# Patient Record
Sex: FEMALE | Race: WHITE | URBAN - NONMETROPOLITAN AREA
[De-identification: names, ages, dates, MRNs, and addresses within clinical notes are randomized per-mention and may not be internally consistent; named-entity substitution may affect disease eponyms.]

---

## 2021-07-01 ENCOUNTER — LAB OUTSIDE AN ENCOUNTER (OUTPATIENT)
Dept: URBAN - NONMETROPOLITAN AREA CLINIC 13 | Facility: CLINIC | Age: 26
End: 2021-07-01

## 2021-07-01 ENCOUNTER — OFFICE VISIT (OUTPATIENT)
Dept: URBAN - NONMETROPOLITAN AREA CLINIC 13 | Facility: CLINIC | Age: 26
End: 2021-07-01
Payer: COMMERCIAL

## 2021-07-01 ENCOUNTER — WEB ENCOUNTER (OUTPATIENT)
Dept: URBAN - NONMETROPOLITAN AREA CLINIC 13 | Facility: CLINIC | Age: 26
End: 2021-07-01

## 2021-07-01 DIAGNOSIS — K92.1 BLOODY STOOL: ICD-10-CM

## 2021-07-01 DIAGNOSIS — R10.9 LEFT SIDED ABDOMINAL PAIN: ICD-10-CM

## 2021-07-01 DIAGNOSIS — R19.7 DIARRHEA, UNSPECIFIED TYPE: ICD-10-CM

## 2021-07-01 PROCEDURE — 99204 OFFICE O/P NEW MOD 45 MIN: CPT | Performed by: INTERNAL MEDICINE

## 2021-07-01 RX ORDER — FLUOXETINE HYDROCHLORIDE 20 MG/1
1 CAPSULE CAPSULE ORAL ONCE A DAY
Status: ACTIVE | COMMUNITY

## 2021-07-01 RX ORDER — FAMOTIDINE 20 MG/1
1 TABLET AT LUNCH AND 1 TABLET AT  BEDTIME TABLET, FILM COATED ORAL TWICE A DAY
Qty: 180 TABLET | Refills: 1 | OUTPATIENT
Start: 2021-07-01

## 2021-07-01 RX ORDER — METRONIDAZOLE 250 MG/1
1 TABLET TABLET ORAL THREE TIMES A DAY
Qty: 21 TABLET | Refills: 0 | OUTPATIENT
Start: 2021-07-01 | End: 2021-07-08

## 2021-07-01 NOTE — HPI-TODAY'S VISIT:
7/1/21 Fariba Hinkle is a very pleasant 24 YO  who presents for first appointment with new onset blood stools with mucous and lower abdominal pain as well as borborgymi and excessive gas that began about 6 weeks ago. Prior to onset she had normal daily stools. Mom, Maribel, is with her today. No history of rectal bleeding previously. She did start a new diet, Optavia, that is a diet high in soy with low carbs about a month prior to onset. Since onset she has stopped most of the dietary supplements other than an occasional bar but no improvement in her symptoms.   She did have a period about 2 weeks ago with n/v associated with the diarrhea x 5 days. No fever or shaking chills. She also reports some mild intermittent epigastric burning discomfort with greasy foods. No dysphagia. She has lost about 15# with the new diet and exercise regimen but no weight loss with above symptoms. She has a paternal cousin with Crohns. No f/h UC, CRC, colon polyps.   No changes in medications/new medications. No antibiotics. No recent travel. No sick contacts. She did go tubing on a river in [place] about a month ago however symptoms started prior to this. She has tried Imodium but was advised to stop this. Also tried Gas X and Tums with no improvement in her symptoms. She has not tried probiotics. TG

## 2021-07-01 NOTE — PHYSICAL EXAM GASTROINTESTINAL
Abdomen  , soft, left lower quadrant and left upper quadrant tenderness on palpation, nondistended , no masses palpable , normal bowel sounds , no hepatomegaly present

## 2021-07-03 LAB
A/G RATIO: 1.8
ALBUMIN: 4.6
ALKALINE PHOSPHATASE: 78
ALT (SGPT): 16
AST (SGOT): 18
BASO (ABSOLUTE): 0.1
BASOS: 1
BILIRUBIN, TOTAL: 0.4
BUN/CREATININE RATIO: 20
BUN: 13
C-REACTIVE PROTEIN, QUANT: 5
CALCIUM: 9.5
CARBON DIOXIDE, TOTAL: 24
CHLORIDE: 101
CREATININE: 0.64
EGFR IF AFRICN AM: 143
EGFR IF NONAFRICN AM: 124
ENDOMYSIAL ANTIBODY IGA: NEGATIVE
EOS (ABSOLUTE): 0.3
EOS: 4
GLOBULIN, TOTAL: 2.6
GLUCOSE: 63
HEMATOCRIT: 40.5
HEMATOLOGY COMMENTS:: (no result)
HEMOGLOBIN: 13
IMMATURE CELLS: (no result)
IMMATURE GRANS (ABS): 0
IMMATURE GRANULOCYTES: 0
IMMUNOGLOBULIN A, QN, SERUM: 176
LYMPHS (ABSOLUTE): 1.8
LYMPHS: 23
MCH: 30.2
MCHC: 32.1
MCV: 94
MONOCYTES(ABSOLUTE): 0.6
MONOCYTES: 8
NEUTROPHILS (ABSOLUTE): 5.1
NEUTROPHILS: 64
NRBC: (no result)
PLATELETS: 351
POTASSIUM: 4.3
PROTEIN, TOTAL: 7.2
RBC: 4.31
RDW: 12.3
SEDIMENTATION RATE-WESTERGREN: 7
SODIUM: 139
T-TRANSGLUTAMINASE (TTG) IGA: <2
TSH: 1.3
WBC: 7.9

## 2021-07-06 ENCOUNTER — CLAIMS CREATED FROM THE CLAIM WINDOW (OUTPATIENT)
Dept: URBAN - METROPOLITAN AREA CLINIC 4 | Facility: CLINIC | Age: 26
End: 2021-07-06
Payer: COMMERCIAL

## 2021-07-06 ENCOUNTER — OFFICE VISIT (OUTPATIENT)
Dept: URBAN - NONMETROPOLITAN AREA SURGERY CENTER 1 | Facility: SURGERY CENTER | Age: 26
End: 2021-07-06
Payer: COMMERCIAL

## 2021-07-06 DIAGNOSIS — K62.5 ANAL BLEEDING: ICD-10-CM

## 2021-07-06 DIAGNOSIS — K51.911 ULCERATIVE COLITIS, UNSPECIFIED WITH RECTAL BLEEDING: ICD-10-CM

## 2021-07-06 DIAGNOSIS — K51.20 CHRONIC ULCERATIVE PROCTITIS: ICD-10-CM

## 2021-07-06 PROCEDURE — 88305 TISSUE EXAM BY PATHOLOGIST: CPT | Performed by: PATHOLOGY

## 2021-07-06 PROCEDURE — G8907 PT DOC NO EVENTS ON DISCHARG: HCPCS | Performed by: INTERNAL MEDICINE

## 2021-07-06 PROCEDURE — 45380 COLONOSCOPY AND BIOPSY: CPT | Performed by: INTERNAL MEDICINE

## 2021-07-06 RX ORDER — FAMOTIDINE 20 MG/1
1 TABLET AT LUNCH AND 1 TABLET AT  BEDTIME TABLET, FILM COATED ORAL TWICE A DAY
Qty: 180 TABLET | Refills: 1 | Status: ACTIVE | COMMUNITY
Start: 2021-07-01

## 2021-07-06 RX ORDER — FLUOXETINE HYDROCHLORIDE 20 MG/1
1 CAPSULE CAPSULE ORAL ONCE A DAY
Status: ACTIVE | COMMUNITY

## 2021-07-06 RX ORDER — METRONIDAZOLE 250 MG/1
1 TABLET TABLET ORAL THREE TIMES A DAY
Qty: 21 TABLET | Refills: 0 | Status: ACTIVE | COMMUNITY
Start: 2021-07-01 | End: 2021-07-08

## 2021-07-15 LAB
C DIFFICILE TOXINS A+B, EIA: NEGATIVE
CALPROTECTIN, FECAL: 589
CAMPYLOBACTER CULTURE: (no result)
E COLI SHIGA TOXIN EIA: NEGATIVE
Lab: (no result)
OVA + PARASITE EXAM: (no result)
SALMONELLA/SHIGELLA SCREEN: (no result)
WHITE BLOOD CELLS (WBC), STOOL: (no result)

## 2021-07-29 ENCOUNTER — OFFICE VISIT (OUTPATIENT)
Dept: URBAN - NONMETROPOLITAN AREA CLINIC 13 | Facility: CLINIC | Age: 26
End: 2021-07-29
Payer: COMMERCIAL

## 2021-07-29 DIAGNOSIS — K92.1 BLOODY STOOL: ICD-10-CM

## 2021-07-29 DIAGNOSIS — K51.20 ULCERATIVE PROCTITIS WITHOUT COMPLICATION: ICD-10-CM

## 2021-07-29 DIAGNOSIS — R10.9 LEFT SIDED ABDOMINAL PAIN: ICD-10-CM

## 2021-07-29 PROCEDURE — 99214 OFFICE O/P EST MOD 30 MIN: CPT | Performed by: INTERNAL MEDICINE

## 2021-07-29 RX ORDER — PREDNISONE 10 MG/1
4 TABLETS X 7 DAYS, THEN 3 TABLETS X 7 DAYS, THEN 2 TABLETS X 7 DAYS, THEN 1 TABLET X 7 DAYS TABLET ORAL ONCE A DAY
Qty: 70 TABLETS | Refills: 2 | OUTPATIENT
Start: 2021-07-29 | End: 2021-10-21

## 2021-07-29 RX ORDER — FLUOXETINE HYDROCHLORIDE 20 MG/1
1 CAPSULE CAPSULE ORAL ONCE A DAY
Status: ACTIVE | COMMUNITY

## 2021-07-29 RX ORDER — FAMOTIDINE 20 MG/1
1 TABLET AT LUNCH AND 1 TABLET AT  BEDTIME TABLET, FILM COATED ORAL TWICE A DAY
Qty: 180 TABLET | Refills: 1 | Status: ACTIVE | COMMUNITY
Start: 2021-07-01

## 2021-07-29 RX ORDER — MESALAMINE 375 MG/1
4 CAPSULES IN THE MORNING CAPSULE, EXTENDED RELEASE ORAL ONCE A DAY
Qty: 120 CAPSULES | Refills: 6 | OUTPATIENT
Start: 2021-07-29 | End: 2022-02-23

## 2021-07-29 NOTE — HPI-TODAY'S VISIT:
7/1/21 Fariba Hinkle is a very pleasant 26 YO  who presents for first appointment with new onset blood stools with mucous and lower abdominal pain as well as borborgymi and excessive gas that began about 6 weeks ago. Prior to onset she had normal daily stools. Mom, Maribel, is with her today. No history of rectal bleeding previously. She did start a new diet, Optavia, that is a diet high in soy with low carbs about a month prior to onset. Since onset she has stopped most of the dietary supplements other than an occasional bar but no improvement in her symptoms.   She did have a period about 2 weeks ago with n/v associated with the diarrhea x 5 days. No fever or shaking chills. She also reports some mild intermittent epigastric burning discomfort with greasy foods. No dysphagia. She has lost about 15# with the new diet and exercise regimen but no weight loss with above symptoms. She has a paternal cousin with Crohns. No f/h UC, CRC, colon polyps.   No changes in medications/new medications. No antibiotics. No recent travel. No sick contacts. She did go tubing on a river in [place] about a month ago however symptoms started prior to this. She has tried Imodium but was advised to stop this. Also tried Gas X and Tums with no improvement in her symptoms. She has not tried probiotics. TG  7/29/21 Fariba presents for follow up after colonoscopy. Procedure completed 7/6/21 with Dr. Becerra with moderate proctitis, sparing of the rectosigmoid, sigmoid, descending, transverse, ascending, and cecum. Path c/w UC. No left sided biopsies. Dr. Becerra sent in Prednisone 40mg daily and Apriso 4 tablets daily however her pharmacy did not receive the prescriptions. She did not call for the prescriptions. On no treatment other than probiotics.  She continues with above symptoms including bloody loose stools, mucous, abdominal pain, loss of appetite. etc. No fever or chills. TG

## 2021-07-29 NOTE — PHYSICAL EXAM EXTREMITIES:
no edema I have personally seen and examined this patient.  I have fully participated in the care of this patient. I have reviewed all pertinent clinical information, including history, physical exam, plan and the Resident’s note and agree except as noted.

## 2021-08-12 ENCOUNTER — OFFICE VISIT (OUTPATIENT)
Dept: URBAN - NONMETROPOLITAN AREA CLINIC 13 | Facility: CLINIC | Age: 26
End: 2021-08-12
Payer: COMMERCIAL

## 2021-08-12 DIAGNOSIS — K92.1 BLOODY STOOL: ICD-10-CM

## 2021-08-12 DIAGNOSIS — Z12.11 COLON CANCER SCREENING: ICD-10-CM

## 2021-08-12 DIAGNOSIS — R10.9 LEFT SIDED ABDOMINAL PAIN: ICD-10-CM

## 2021-08-12 DIAGNOSIS — K51.20 ULCERATIVE PROCTITIS WITHOUT COMPLICATION: ICD-10-CM

## 2021-08-12 PROCEDURE — 99214 OFFICE O/P EST MOD 30 MIN: CPT | Performed by: INTERNAL MEDICINE

## 2021-08-12 RX ORDER — FLUOXETINE HYDROCHLORIDE 20 MG/1
1 CAPSULE CAPSULE ORAL ONCE A DAY
Status: ACTIVE | COMMUNITY

## 2021-08-12 RX ORDER — PREDNISONE 10 MG/1
4 TABLETS X 7 DAYS, THEN 3 TABLETS X 7 DAYS, THEN 2 TABLETS X 7 DAYS, THEN 1 TABLET X 7 DAYS TABLET ORAL ONCE A DAY
Qty: 70 TABLETS | Refills: 2 | Status: ACTIVE | COMMUNITY
Start: 2021-07-29 | End: 2021-10-21

## 2021-08-12 RX ORDER — MESALAMINE 375 MG/1
4 CAPSULES IN THE MORNING CAPSULE, EXTENDED RELEASE ORAL ONCE A DAY
Qty: 120 CAPSULES | Refills: 6 | Status: ACTIVE | COMMUNITY
Start: 2021-07-29 | End: 2022-02-23

## 2021-08-12 RX ORDER — FAMOTIDINE 20 MG/1
1 TABLET AT LUNCH AND 1 TABLET AT  BEDTIME TABLET, FILM COATED ORAL TWICE A DAY
Qty: 180 TABLET | Refills: 1 | Status: ACTIVE | COMMUNITY
Start: 2021-07-01

## 2021-08-12 RX ORDER — PREDNISONE 10 MG/1
4 TABLETS X 7 DAYS, THEN 3 TABLETS X 7 DAYS, THEN 2 TABLETS X 7 DAYS, THEN 1 TABLET X 7 DAYS TABLET ORAL ONCE A DAY
OUTPATIENT
Start: 2021-07-29

## 2021-08-12 RX ORDER — MESALAMINE 375 MG/1
4 CAPSULES IN THE MORNING CAPSULE, EXTENDED RELEASE ORAL ONCE A DAY
OUTPATIENT
Start: 2021-07-29

## 2021-08-12 NOTE — HPI-TODAY'S VISIT:
7/1/21 Fariba Hinkle is a very pleasant 24 YO  who presents for first appointment with new onset blood stools with mucous and lower abdominal pain as well as borborgymi and excessive gas that began about 6 weeks ago. Prior to onset she had normal daily stools. Mom, Maribel, is with her today. No history of rectal bleeding previously. She did start a new diet, Optavia, that is a diet high in soy with low carbs about a month prior to onset. Since onset she has stopped most of the dietary supplements other than an occasional bar but no improvement in her symptoms.   She did have a period about 2 weeks ago with n/v associated with the diarrhea x 5 days. No fever or shaking chills. She also reports some mild intermittent epigastric burning discomfort with greasy foods. No dysphagia. She has lost about 15# with the new diet and exercise regimen but no weight loss with above symptoms. She has a paternal cousin with Crohns. No f/h UC, CRC, colon polyps.   No changes in medications/new medications. No antibiotics. No recent travel. No sick contacts. She did go tubing on a river in [place] about a month ago however symptoms started prior to this. She has tried Imodium but was advised to stop this. Also tried Gas X and Tums with no improvement in her symptoms. She has not tried probiotics. TG  7/29/21 Fariba presents for follow up after colonoscopy. Procedure completed 7/6/21 with Dr. eBcerra with moderate proctitis, sparing of the rectosigmoid, sigmoid, descending, transverse, ascending, and cecum. Path c/w UC. No left sided biopsies. Dr. Becerra sent in Prednisone 40mg daily and Apriso 4 tablets daily however her pharmacy did not receive the prescriptions. She did not call for the prescriptions. On no treatment other than probiotics.  She continues with above symptoms including bloody loose stools, mucous, abdominal pain, loss of appetite. etc. No fever or chills. 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  She is on Apriso 4 tablets daily.  This was affordable; only $30. Continues on prednisone taper, currently on 30 mg daily and will titrate down to 20 mg tomorrow.  She is having normal bowel movements 1-2 times a day.  No rectal bleeding or mucus.  Tenesmus has resolved.  No nocturnal awakenings.  Her appetite is improving.  She did struggle with some constipation and gas with bloating last week but that seems to be improving as well.  She has multiple questions regarding diet and possible FODMAP.  We discussed this at length.  Hold off until she has clinical remission but will consider nutrition consultation at follow-up.  She has no new complaints and overall is happy with her improvement.  TG

## 2021-09-07 ENCOUNTER — TELEPHONE ENCOUNTER (OUTPATIENT)
Dept: URBAN - NONMETROPOLITAN AREA CLINIC 2 | Facility: CLINIC | Age: 26
End: 2021-09-07

## 2021-11-11 ENCOUNTER — OFFICE VISIT (OUTPATIENT)
Dept: URBAN - NONMETROPOLITAN AREA CLINIC 13 | Facility: CLINIC | Age: 26
End: 2021-11-11

## 2021-12-06 ENCOUNTER — OFFICE VISIT (OUTPATIENT)
Dept: URBAN - NONMETROPOLITAN AREA CLINIC 2 | Facility: CLINIC | Age: 26
End: 2021-12-06
Payer: COMMERCIAL

## 2021-12-06 DIAGNOSIS — M25.511 PAIN IN RIGHT SHOULDER: ICD-10-CM

## 2021-12-06 DIAGNOSIS — K51.20 ULCERATIVE PROCTITIS WITHOUT COMPLICATION: ICD-10-CM

## 2021-12-06 DIAGNOSIS — Z12.11 COLON CANCER SCREENING: ICD-10-CM

## 2021-12-06 DIAGNOSIS — R10.9 LEFT SIDED ABDOMINAL PAIN: ICD-10-CM

## 2021-12-06 DIAGNOSIS — M25.512 PAIN IN LEFT SHOULDER: ICD-10-CM

## 2021-12-06 DIAGNOSIS — K92.1 BLOODY STOOL: ICD-10-CM

## 2021-12-06 PROCEDURE — 99214 OFFICE O/P EST MOD 30 MIN: CPT | Performed by: NURSE PRACTITIONER

## 2021-12-06 RX ORDER — ADALIMUMAB 80MG/0.8ML
80MG DAY 1, DAY 2, DAY 15 KIT SUBCUTANEOUS
Qty: 3 PRE-FILLED PEN SYRINGE | Refills: 0 | OUTPATIENT
Start: 2021-12-06 | End: 2021-12-21

## 2021-12-06 RX ORDER — MESALAMINE 375 MG/1
4 CAPSULES IN THE MORNING CAPSULE, EXTENDED RELEASE ORAL ONCE A DAY
Status: ON HOLD | COMMUNITY
Start: 2021-07-29

## 2021-12-06 RX ORDER — FLUOXETINE HYDROCHLORIDE 20 MG/1
1 CAPSULE CAPSULE ORAL ONCE A DAY
Status: ACTIVE | COMMUNITY

## 2021-12-06 RX ORDER — PREDNISONE 10 MG/1
4 TABLETS X 7 DAYS, THEN 3 TABLETS X 7 DAYS, THEN 2 TABLETS X 7 DAYS, THEN 1 TABLET X 7 DAYS TABLET ORAL ONCE A DAY
Qty: 70 TABLETS | Refills: 0 | OUTPATIENT

## 2021-12-06 RX ORDER — ADALIMUMAB 40MG/0.4ML
1 SUBQ  Q 2 WEEKS KIT SUBCUTANEOUS
Qty: 6 PRE-FILLED PEN SYRINGE | Refills: 1 | OUTPATIENT
Start: 2021-12-06 | End: 2022-05-23

## 2021-12-06 RX ORDER — PREDNISONE 10 MG/1
4 TABLETS X 7 DAYS, THEN 3 TABLETS X 7 DAYS, THEN 2 TABLETS X 7 DAYS, THEN 1 TABLET X 7 DAYS TABLET ORAL ONCE A DAY
Status: ON HOLD | COMMUNITY
Start: 2021-07-29

## 2021-12-06 RX ORDER — FAMOTIDINE 20 MG/1
1 TABLET AT LUNCH AND 1 TABLET AT  BEDTIME TABLET, FILM COATED ORAL TWICE A DAY
Qty: 180 TABLET | Refills: 1 | Status: ACTIVE | COMMUNITY
Start: 2021-07-01

## 2021-12-06 NOTE — HPI-TODAY'S VISIT:
7/1/21 Fariba Hinkle is a very pleasant 26 YO  who presents for first appointment with new onset blood stools with mucous and lower abdominal pain as well as borborgymi and excessive gas that began about 6 weeks ago. Prior to onset she had normal daily stools. Mom, Maribel, is with her today. No history of rectal bleeding previously. She did start a new diet, Optavia, that is a diet high in soy with low carbs about a month prior to onset. Since onset she has stopped most of the dietary supplements other than an occasional bar but no improvement in her symptoms.   She did have a period about 2 weeks ago with n/v associated with the diarrhea x 5 days. No fever or shaking chills. She also reports some mild intermittent epigastric burning discomfort with greasy foods. No dysphagia. She has lost about 15# with the new diet and exercise regimen but no weight loss with above symptoms. She has a paternal cousin with Crohns. No f/h UC, CRC, colon polyps.   No changes in medications/new medications. No antibiotics. No recent travel. No sick contacts. She did go tubing on a river in [place] about a month ago however symptoms started prior to this. She has tried Imodium but was advised to stop this. Also tried Gas X and Tums with no improvement in her symptoms. She has not tried probiotics. TG  7/29/21 Fariba presents for follow up after colonoscopy. Procedure completed 7/6/21 with Dr. Becerra with moderate proctitis, sparing of the rectosigmoid, sigmoid, descending, transverse, ascending, and cecum. Path c/w UC. No left sided biopsies. Dr. Becerra sent in Prednisone 40mg daily and Apriso 4 tablets daily however her pharmacy did not receive the prescriptions. She did not call for the prescriptions. On no treatment other than probiotics.  She continues with above symptoms including bloody loose stools, mucous, abdominal pain, loss of appetite. etc. No fever or chills. 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  She is on Apriso 4 tablets daily.  This was affordable; only $30. Continues on prednisone taper, currently on 30 mg daily and will titrate down to 20 mg tomorrow.  She is having normal bowel movements 1-2 times a day.  No rectal bleeding or mucus.  Tenesmus has resolved.  No nocturnal awakenings.  Her appetite is improving.  She did struggle with some constipation and gas with bloating last week but that seems to be improving as well.  She has multiple questions regarding diet and possible FODMAP.  We discussed this at length.  Hold off until she has clinical remission but will consider nutrition consultation at follow-up.  She has no new complaints and overall is happy with her improvement.  TG  12/6/2021 Fariba presents for follow up for ulcerative proctitis. At her last appiontment she was finishing up prednisone taper and doing well with mesalamine 0.375mg 4 a day. She began with diarrhea, rectal bleeding, mucous in stools, lower abdominal pain, and tenesmus about a week after completing the prednisone taper despite remaining on mesalamine. She did not complete labs at her last appointment. She reports she was unable to get anyone in the [place] location to talk about getting an appointment. She has continued with flare for about 2 months now. She has joint pain--mainly shoulders and wrists.  She was diagnosed with a sinus infection at urgent care this weekend and was started on a prednisone dose pack on Saturday. She did have a COVID test that was negative.  Since her last appointment, she did move to [place]. She continues to work at the [employer] on Tuesday and Wednesdays and is off every Monday.

## 2021-12-07 ENCOUNTER — TELEPHONE ENCOUNTER (OUTPATIENT)
Dept: URBAN - NONMETROPOLITAN AREA CLINIC 2 | Facility: CLINIC | Age: 26
End: 2021-12-07

## 2021-12-09 LAB
A/G RATIO: 2
ALBUMIN: 4.9
ALKALINE PHOSPHATASE: 102
ALT (SGPT): 19
AST (SGOT): 19
BASO (ABSOLUTE): 0.1
BASOS: 1
BILIRUBIN, TOTAL: 0.2
BUN/CREATININE RATIO: 22
BUN: 16
C-REACTIVE PROTEIN, QUANT: <1
CALCIUM: 9.7
CALPROTECTIN, FECAL: 77
CARBON DIOXIDE, TOTAL: 22
CHLORIDE: 102
CREATININE: 0.74
EGFR IF AFRICN AM: 129
EGFR IF NONAFRICN AM: 112
EOS (ABSOLUTE): 0.3
EOS: 3
GLOBULIN, TOTAL: 2.5
GLUCOSE: 82
HBSAG SCREEN: NEGATIVE
HCV AB: <0.1
HEMATOCRIT: 42.7
HEMATOLOGY COMMENTS:: (no result)
HEMOGLOBIN: 14.5
HEP A AB, IGM: NEGATIVE
HEP B CORE AB, IGM: NEGATIVE
IMMATURE CELLS: (no result)
IMMATURE GRANS (ABS): 0
IMMATURE GRANULOCYTES: 0
INTERPRETATION:: (no result)
LYMPHS (ABSOLUTE): 2.4
LYMPHS: 25
MCH: 30.9
MCHC: 34
MCV: 91
MONOCYTES(ABSOLUTE): 0.8
MONOCYTES: 9
NEUTROPHILS (ABSOLUTE): 6
NEUTROPHILS: 62
NRBC: (no result)
PLATELETS: 419
POTASSIUM: 4.4
PROTEIN, TOTAL: 7.4
QUANTIFERON CRITERIA: (no result)
QUANTIFERON INCUBATION: (no result)
QUANTIFERON MITOGEN VALUE: >10
QUANTIFERON NIL VALUE: 0.01
QUANTIFERON TB1 AG VALUE: 0
QUANTIFERON TB2 AG VALUE: 0
QUANTIFERON-TB GOLD PLUS: NEGATIVE
RBC: 4.69
RDW: 11.8
SEDIMENTATION RATE-WESTERGREN: 3
SODIUM: 138
WBC: 9.7

## 2022-01-10 ENCOUNTER — OFFICE VISIT (OUTPATIENT)
Dept: URBAN - NONMETROPOLITAN AREA CLINIC 2 | Facility: CLINIC | Age: 27
End: 2022-01-10

## 2022-01-10 ENCOUNTER — TELEPHONE ENCOUNTER (OUTPATIENT)
Dept: URBAN - NONMETROPOLITAN AREA CLINIC 2 | Facility: CLINIC | Age: 27
End: 2022-01-10

## 2022-01-10 RX ORDER — PREDNISONE 10 MG/1
4 TABLETS X 7 DAYS, THEN 3 TABLETS X 7 DAYS, THEN 2 TABLETS X 7 DAYS, THEN 1 TABLET X 7 DAYS TABLET ORAL ONCE A DAY
Qty: 70 TABLETS | Refills: 0 | COMMUNITY

## 2022-01-10 RX ORDER — PREDNISONE 10 MG/1
4 TABLETS X 7 DAYS, THEN 3 TABLETS X 7 DAYS, THEN 2 TABLETS X 7 DAYS, THEN 1 TABLET X 7 DAYS TABLET ORAL ONCE A DAY
Qty: 70 TABLETS | Refills: 0
End: 2022-02-07

## 2022-01-10 RX ORDER — ADALIMUMAB 40MG/0.4ML
1 SUBQ  Q 2 WEEKS KIT SUBCUTANEOUS
Qty: 6 PRE-FILLED PEN SYRINGE | Refills: 1 | COMMUNITY
Start: 2021-12-06 | End: 2022-05-23

## 2022-01-10 RX ORDER — MESALAMINE 375 MG/1
4 CAPSULES IN THE MORNING CAPSULE, EXTENDED RELEASE ORAL ONCE A DAY
COMMUNITY
Start: 2021-07-29

## 2022-01-10 RX ORDER — FAMOTIDINE 20 MG/1
1 TABLET AT LUNCH AND 1 TABLET AT  BEDTIME TABLET, FILM COATED ORAL TWICE A DAY
Qty: 180 TABLET | Refills: 1 | COMMUNITY
Start: 2021-07-01

## 2022-01-10 RX ORDER — FLUOXETINE HYDROCHLORIDE 20 MG/1
1 CAPSULE CAPSULE ORAL ONCE A DAY
COMMUNITY

## 2022-02-21 ENCOUNTER — OFFICE VISIT (OUTPATIENT)
Dept: URBAN - NONMETROPOLITAN AREA CLINIC 2 | Facility: CLINIC | Age: 27
End: 2022-02-21
Payer: COMMERCIAL

## 2022-02-21 DIAGNOSIS — K51.20 ULCERATIVE PROCTITIS WITHOUT COMPLICATION: ICD-10-CM

## 2022-02-21 DIAGNOSIS — R10.9 LEFT SIDED ABDOMINAL PAIN: ICD-10-CM

## 2022-02-21 DIAGNOSIS — K92.1 BLOODY STOOL: ICD-10-CM

## 2022-02-21 DIAGNOSIS — Z12.11 COLON CANCER SCREENING: ICD-10-CM

## 2022-02-21 PROCEDURE — 99213 OFFICE O/P EST LOW 20 MIN: CPT | Performed by: NURSE PRACTITIONER

## 2022-02-21 RX ORDER — MESALAMINE 375 MG/1
4 CAPSULES IN THE MORNING CAPSULE, EXTENDED RELEASE ORAL ONCE A DAY
Status: DISCONTINUED | COMMUNITY
Start: 2021-07-29

## 2022-02-21 RX ORDER — FLUOXETINE HYDROCHLORIDE 20 MG/1
1 CAPSULE CAPSULE ORAL ONCE A DAY
Status: DISCONTINUED | COMMUNITY

## 2022-02-21 RX ORDER — PREDNISONE 10 MG/1
4 TABLETS X 7 DAYS, THEN 3 TABLETS X 7 DAYS, THEN 2 TABLETS X 7 DAYS, THEN 1 TABLET X 7 DAYS TABLET ORAL ONCE A DAY
Qty: 70 TABLETS | Refills: 0 | Status: DISCONTINUED | COMMUNITY

## 2022-02-21 RX ORDER — FAMOTIDINE 20 MG/1
1 TABLET AT LUNCH AND 1 TABLET AT  BEDTIME TABLET, FILM COATED ORAL TWICE A DAY
Qty: 180 TABLET | Refills: 1 | Status: ACTIVE | COMMUNITY
Start: 2021-07-01

## 2022-02-21 RX ORDER — ADALIMUMAB 40MG/0.4ML
1 SUBQ  Q 2 WEEKS KIT SUBCUTANEOUS
Qty: 6 PRE-FILLED PEN SYRINGE | Refills: 1 | Status: ACTIVE | COMMUNITY
Start: 2021-12-06 | End: 2022-05-23

## 2022-02-21 RX ORDER — ADALIMUMAB 40MG/0.4ML
1 SUBQ  Q 2 WEEKS KIT SUBCUTANEOUS
Qty: 6 PRE-FILLED PEN SYRINGE | Refills: 1 | OUTPATIENT

## 2022-02-21 NOTE — HPI-TODAY'S VISIT:
2/21/22 Fariba presents for UC follow up. She was finally able to start Humira the beginning of February. She completed induction dosing on 2/7/22 and 2/16/22 and is due for her first maintenance pen on 3/2/22. She does not have the maintenance pens yet. The Rx was not forwarded to [facility] from [facility]. She had COVID in January and symptoms resolved within about a week. Her UC symptoms are significantly better on Humira. Reports 3 stools most days. Urgency and pain is better. No bleeding in the last week. No EIM.   Colonoscopy 7/6/21 with Dr. Becerra with moderate proctitis, sparing of the rectosigmoid, sigmoid, descending, transverse, ascending, and cecum. Path c/w UC. No left sided biopsies.TG

## 2022-03-03 ENCOUNTER — TELEPHONE ENCOUNTER (OUTPATIENT)
Dept: URBAN - NONMETROPOLITAN AREA CLINIC 2 | Facility: CLINIC | Age: 27
End: 2022-03-03

## 2022-04-08 ENCOUNTER — TELEPHONE ENCOUNTER (OUTPATIENT)
Dept: URBAN - NONMETROPOLITAN AREA CLINIC 2 | Facility: CLINIC | Age: 27
End: 2022-04-08

## 2022-04-14 ENCOUNTER — ERX REFILL RESPONSE (OUTPATIENT)
Dept: URBAN - NONMETROPOLITAN AREA CLINIC 2 | Facility: CLINIC | Age: 27
End: 2022-04-14

## 2022-04-14 ENCOUNTER — TELEPHONE ENCOUNTER (OUTPATIENT)
Dept: URBAN - NONMETROPOLITAN AREA CLINIC 2 | Facility: CLINIC | Age: 27
End: 2022-04-14

## 2022-04-14 RX ORDER — ADALIMUMAB 40MG/0.4ML
1 SUBQ  Q 2 WEEKS KIT SUBCUTANEOUS
Qty: 6 PRE-FILLED PEN SYRINGE | Refills: 1

## 2022-04-14 RX ORDER — ADALIMUMAB 40MG/0.4ML
INJECT 1 PEN SUBCUTANEOUSLY EVERY 2 WEEKS KIT SUBCUTANEOUS
Qty: 1 KIT | Refills: 6 | OUTPATIENT

## 2022-04-14 RX ORDER — ADALIMUMAB 40MG/0.4ML
1 SUBQ  Q 2 WEEKS KIT SUBCUTANEOUS
Qty: 6 PRE-FILLED PEN SYRINGE | Refills: 1 | OUTPATIENT

## 2022-05-16 ENCOUNTER — OFFICE VISIT (OUTPATIENT)
Dept: URBAN - NONMETROPOLITAN AREA CLINIC 13 | Facility: CLINIC | Age: 27
End: 2022-05-16
Payer: COMMERCIAL

## 2022-05-16 DIAGNOSIS — K51.20 ULCERATIVE PROCTITIS WITHOUT COMPLICATION: ICD-10-CM

## 2022-05-16 DIAGNOSIS — Z12.11 COLON CANCER SCREENING: ICD-10-CM

## 2022-05-16 DIAGNOSIS — Z51.81 THERAPEUTIC DRUG MONITORING: ICD-10-CM

## 2022-05-16 PROBLEM — 305058001: Status: ACTIVE | Noted: 2022-02-21

## 2022-05-16 PROCEDURE — 99214 OFFICE O/P EST MOD 30 MIN: CPT | Performed by: NURSE PRACTITIONER

## 2022-05-16 RX ORDER — FAMOTIDINE 20 MG/1
1 TABLET AT LUNCH AND 1 TABLET AT  BEDTIME TABLET, FILM COATED ORAL TWICE A DAY
Qty: 180 TABLET | Refills: 1 | Status: ACTIVE | COMMUNITY
Start: 2021-07-01

## 2022-05-16 RX ORDER — ADALIMUMAB 40MG/0.4ML
INJECT 1 PEN SUBCUTANEOUSLY EVERY 2 WEEKS KIT SUBCUTANEOUS
OUTPATIENT

## 2022-05-16 RX ORDER — ADALIMUMAB 40MG/0.4ML
INJECT 1 PEN SUBCUTANEOUSLY EVERY 2 WEEKS KIT SUBCUTANEOUS
Qty: 1 KIT | Refills: 6 | Status: ACTIVE | COMMUNITY

## 2022-05-16 NOTE — HPI-TODAY'S VISIT:
2/21/22 Fariba presents for UC follow up. She was finally able to start Humira the beginning of February. She completed induction dosing on 2/7/22 and 2/16/22 and is due for her first maintenance pen on 3/2/22. She does not have the maintenance pens yet. The Rx was not forwarded to [facility] from [facility]. She had COVID in January and symptoms resolved within about a week. Her UC symptoms are significantly better on Humira. Reports 3 stools most days. Urgency and pain is better. No bleeding in the last week. No EIM.   Colonoscopy 7/6/21 with Dr. Becerra with moderate proctitis, sparing of the rectosigmoid, sigmoid, descending, transverse, ascending, and cecum. Path c/w UC. No left sided biopsies.TG  5/16/2020 Fariba presents for UC follow-up.  She is on Humira every other week.  She does well for about 10 days after her injection and then will start with some rectal bleeding and mucus in her stool.  Frequency of bowel movement will increase from 2 formed stools daily to 3-4 looser stools.  No nocturnal awakenings.  No abdominal pain.  She has noted knee pain and wrist pain that starts around the time of the bleeding.  TG

## 2022-05-24 ENCOUNTER — LAB OUTSIDE AN ENCOUNTER (OUTPATIENT)
Dept: URBAN - NONMETROPOLITAN AREA CLINIC 13 | Facility: CLINIC | Age: 27
End: 2022-05-24

## 2022-06-01 ENCOUNTER — TELEPHONE ENCOUNTER (OUTPATIENT)
Dept: URBAN - METROPOLITAN AREA CLINIC 92 | Facility: CLINIC | Age: 27
End: 2022-06-01

## 2022-06-01 LAB
A/G RATIO: 1.8
ADALIMUMAB DRUG LEVEL: 5.4
ALBUMIN: 4.6
ALKALINE PHOSPHATASE: 89
ALT (SGPT): 11
ANTI-ADALIMUMAB ANTIBODY: 99
AST (SGOT): 10
BASO (ABSOLUTE): 0.1
BASOS: 1
BILIRUBIN, TOTAL: 0.6
BUN/CREATININE RATIO: 30
BUN: 16
C-REACTIVE PROTEIN, QUANT: <1
CALCIUM: 9.1
CARBON DIOXIDE, TOTAL: 23
CHLORIDE: 102
CREATININE: 0.53
EGFR: 131
EOS (ABSOLUTE): 0.3
EOS: 5
GLOBULIN, TOTAL: 2.6
GLUCOSE: 89
HEMATOCRIT: 42.7
HEMATOLOGY COMMENTS:: (no result)
HEMOGLOBIN: 14.1
IMMATURE CELLS: (no result)
IMMATURE GRANS (ABS): 0
IMMATURE GRANULOCYTES: 0
LYMPHS (ABSOLUTE): 2.5
LYMPHS: 36
MCH: 30.5
MCHC: 33
MCV: 92
MONOCYTES(ABSOLUTE): 0.6
MONOCYTES: 8
NEUTROPHILS (ABSOLUTE): 3.4
NEUTROPHILS: 50
NRBC: (no result)
PLATELETS: 330
POTASSIUM: 4.2
PROTEIN, TOTAL: 7.2
RBC: 4.62
RDW: 12.2
SEDIMENTATION RATE-WESTERGREN: 2
SODIUM: 138
WBC: 6.8

## 2022-06-01 RX ORDER — ADALIMUMAB 40MG/0.4ML
INJECT 1 PEN SUBCUTANEOUSLY EVERY 2 WEEKS KIT SUBCUTANEOUS
Status: ACTIVE | COMMUNITY

## 2022-06-01 RX ORDER — ADALIMUMAB 40MG/0.4ML
1 SUBQ Q  WEEK KIT SUBCUTANEOUS
Qty: 12 | Refills: 0 | OUTPATIENT

## 2022-06-01 RX ORDER — FAMOTIDINE 20 MG/1
1 TABLET AT LUNCH AND 1 TABLET AT  BEDTIME TABLET, FILM COATED ORAL TWICE A DAY
Qty: 180 TABLET | Refills: 1 | Status: ACTIVE | COMMUNITY
Start: 2021-07-01

## 2022-06-07 ENCOUNTER — TELEPHONE ENCOUNTER (OUTPATIENT)
Dept: URBAN - METROPOLITAN AREA CLINIC 92 | Facility: CLINIC | Age: 27
End: 2022-06-07

## 2022-06-07 PROBLEM — 3951002: Status: ACTIVE | Noted: 2021-07-29

## 2022-06-07 RX ORDER — ADALIMUMAB 40MG/0.4ML
1 SUBQ Q  WEEK KIT SUBCUTANEOUS
Qty: 12 PRE-FILLED PEN SYRINGE | Refills: 1

## 2022-08-15 ENCOUNTER — WEB ENCOUNTER (OUTPATIENT)
Dept: URBAN - NONMETROPOLITAN AREA CLINIC 13 | Facility: CLINIC | Age: 27
End: 2022-08-15

## 2022-08-15 ENCOUNTER — WEB ENCOUNTER (OUTPATIENT)
Dept: URBAN - METROPOLITAN AREA CLINIC 92 | Facility: CLINIC | Age: 27
End: 2022-08-15

## 2022-08-15 ENCOUNTER — LAB OUTSIDE AN ENCOUNTER (OUTPATIENT)
Dept: URBAN - NONMETROPOLITAN AREA CLINIC 2 | Facility: CLINIC | Age: 27
End: 2022-08-15

## 2022-08-15 ENCOUNTER — OFFICE VISIT (OUTPATIENT)
Dept: URBAN - NONMETROPOLITAN AREA CLINIC 13 | Facility: CLINIC | Age: 27
End: 2022-08-15
Payer: COMMERCIAL

## 2022-08-15 VITALS
DIASTOLIC BLOOD PRESSURE: 82 MMHG | HEIGHT: 67 IN | BODY MASS INDEX: 25.27 KG/M2 | SYSTOLIC BLOOD PRESSURE: 125 MMHG | HEART RATE: 106 BPM | WEIGHT: 161 LBS

## 2022-08-15 DIAGNOSIS — K51.211 ULCERATIVE PROCTITIS WITH RECTAL BLEEDING: ICD-10-CM

## 2022-08-15 DIAGNOSIS — D84.9 IMMUNOSUPPRESSED STATUS: ICD-10-CM

## 2022-08-15 PROBLEM — 38013005: Status: ACTIVE | Noted: 2022-08-15

## 2022-08-15 PROBLEM — 3951002: Status: ACTIVE | Noted: 2022-08-15

## 2022-08-15 LAB
A/G RATIO: 1.6
ALBUMIN: 4.6
ALKALINE PHOSPHATASE: 86
ALT (SGPT): 10
ANION GAP: 12
AST (SGOT): 16
BASOPHILS AUTOMATED ABSOLUTE COUNT: 0.1
BASOPHILS RELATIVE PERCENT: 0.9
BILIRUBIN TOTAL: 0.7
BLOOD UREA NITROGEN: 14
BUN / CREAT RATIO: 21
C-REACTIVE PROTEIN: 0.14
CALCIUM: 9.7
CHLORIDE: 103
CO2: 27
CREATININE, SERUM: 0.67
EGFR (CKD-EPI): >60
EOSINOPHILS AUTOMATED ABSOLUTE COUNT: 0.7
EOSINOPHILS RELATIVE PERCENT: 10.3
GLUCOSE: 91
HEMATOCRIT: 40.1
HEMOGLOBIN: 13.3
LYMPHOCYTES AUTOMATED ABSOLUTE COUNT: 1.9
LYMPHOCYTES RELATIVE PERCENT: 27.7
MEAN CORPUSCULAR HEMOGLOBIN CONC: 33.2
MEAN CORPUSCULAR HEMOGLOBIN: 30.8
MEAN CORPUSCULAR VOLUME: 92.5
MEAN PLATELET VOLUME: 6.7
MONOCYTES AUTOMATED ABSOLUTE COUNT: 0.6
MONOCYTES RELATIVE PERCENT: 8.6
NEUTROPHILS AUTOMATED ABSOLUTE: 3.7
NEUTROPHILS RELATIVE PERCENT: 52.5
PLATELET COUNT: 373
POTASSIUM: 4
PROTEIN TOTAL: 7.5
RED BLOOD CELL COUNT: 4.33
RED CELL DISTRIBUTION WIDTH: 12.5
SODIUM: 138
WHITE BLOOD CELL COUNT: 7

## 2022-08-15 PROCEDURE — 99214 OFFICE O/P EST MOD 30 MIN: CPT | Performed by: NURSE PRACTITIONER

## 2022-08-15 RX ORDER — UPADACITINIB 45 MG/1
1 TABLET TABLET, EXTENDED RELEASE ORAL ONCE A DAY
Qty: 56 TABLET | Refills: 0 | OUTPATIENT
Start: 2022-08-15 | End: 2022-10-10

## 2022-08-15 RX ORDER — ADALIMUMAB 40MG/0.4ML
1 SUBQ Q  WEEK KIT SUBCUTANEOUS
Qty: 12 PRE-FILLED PEN SYRINGE | Refills: 1 | Status: ACTIVE | COMMUNITY

## 2022-08-15 RX ORDER — FAMOTIDINE 20 MG/1
1 TABLET AT LUNCH AND 1 TABLET AT  BEDTIME TABLET, FILM COATED ORAL TWICE A DAY
Qty: 180 TABLET | Refills: 1 | Status: ACTIVE | COMMUNITY
Start: 2021-07-01

## 2022-08-15 RX ORDER — UPADACITINIB 15 MG/1
START AFTER 8 WEEK 45MG INDUCTION- 1 TABLET TABLET, EXTENDED RELEASE ORAL ONCE A DAY
Qty: 30 | Refills: 11 | OUTPATIENT
Start: 2022-08-15 | End: 2023-08-10

## 2022-08-15 RX ORDER — PREDNISONE 20 MG/1
40MG X7 DAYS, 30MGX 7 DAYS, 20MG X 7 DAYS, AND 10MG X7 DAYS AND STOP TABLET ORAL ONCE A DAY
Qty: 35 TABLET | Refills: 0 | OUTPATIENT
Start: 2022-08-15 | End: 2022-09-12

## 2022-08-15 RX ORDER — ADALIMUMAB 40MG/0.4ML
INJECT 1 PEN SUBCUTANEOUSLY EVERY 2 WEEKS KIT SUBCUTANEOUS
Status: ACTIVE | COMMUNITY

## 2022-08-15 NOTE — HPI-TODAY'S VISIT:
Is a 27-year-old female who returns for follow-up of ulcerative proctitis.  She has previously failed mesalamine with Apriso.  She is continue to have loose stool abdominal cramping and about 4 bloody BMs daily.  At her last office visit, Humira levels showed that they were a little low and she had some mild antibodies to Humira.  Dosing was increased to weekly and she has done this for the last 2 to 3 months and has not improved complaints.  No additional complaints.  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  She just works part-time in [place] on Tuesdays and Wednesdays. [place]

## 2022-08-30 ENCOUNTER — TELEPHONE ENCOUNTER (OUTPATIENT)
Dept: URBAN - NONMETROPOLITAN AREA CLINIC 2 | Facility: CLINIC | Age: 27
End: 2022-08-30

## 2022-11-17 ENCOUNTER — TELEPHONE ENCOUNTER (OUTPATIENT)
Dept: URBAN - METROPOLITAN AREA CLINIC 92 | Facility: CLINIC | Age: 27
End: 2022-11-17

## 2022-11-17 RX ORDER — UPADACITINIB 15 MG/1
START AFTER 8 WEEK 45MG INDUCTION- 1 TABLET TABLET, EXTENDED RELEASE ORAL ONCE A DAY
Qty: 30 | Refills: 11 | OUTPATIENT
Start: 2022-11-17 | End: 2023-11-12

## 2022-12-28 ENCOUNTER — OFFICE VISIT (OUTPATIENT)
Dept: URBAN - NONMETROPOLITAN AREA CLINIC 2 | Facility: CLINIC | Age: 27
End: 2022-12-28

## 2023-03-28 ENCOUNTER — OFFICE VISIT (OUTPATIENT)
Dept: URBAN - NONMETROPOLITAN AREA CLINIC 2 | Facility: CLINIC | Age: 28
End: 2023-03-28

## 2023-03-28 ENCOUNTER — TELEPHONE ENCOUNTER (OUTPATIENT)
Dept: URBAN - NONMETROPOLITAN AREA CLINIC 2 | Facility: CLINIC | Age: 28
End: 2023-03-28

## 2023-03-31 ENCOUNTER — OFFICE VISIT (OUTPATIENT)
Dept: URBAN - NONMETROPOLITAN AREA CLINIC 2 | Facility: CLINIC | Age: 28
End: 2023-03-31
Payer: COMMERCIAL

## 2023-03-31 DIAGNOSIS — D84.9 IMMUNOSUPPRESSED STATUS: ICD-10-CM

## 2023-03-31 DIAGNOSIS — K51.211 ULCERATIVE PROCTITIS WITH RECTAL BLEEDING: ICD-10-CM

## 2023-03-31 PROCEDURE — 99213 OFFICE O/P EST LOW 20 MIN: CPT | Performed by: NURSE PRACTITIONER

## 2023-03-31 RX ORDER — ADALIMUMAB 40MG/0.4ML
1 SUBQ Q  WEEK KIT SUBCUTANEOUS
Qty: 12 PRE-FILLED PEN SYRINGE | Refills: 1 | Status: ACTIVE | COMMUNITY

## 2023-03-31 RX ORDER — FAMOTIDINE 20 MG/1
1 TABLET AT LUNCH AND 1 TABLET AT  BEDTIME TABLET, FILM COATED ORAL TWICE A DAY
Qty: 180 TABLET | Refills: 1 | Status: ACTIVE | COMMUNITY
Start: 2021-07-01

## 2023-03-31 RX ORDER — ADALIMUMAB 40MG/0.4ML
INJECT 1 PEN SUBCUTANEOUSLY EVERY 2 WEEKS KIT SUBCUTANEOUS
Status: ACTIVE | COMMUNITY

## 2023-03-31 RX ORDER — UPADACITINIB 15 MG/1
START AFTER 8 WEEK 45MG INDUCTION- 1 TABLET TABLET, EXTENDED RELEASE ORAL ONCE A DAY
Qty: 30 | Refills: 11 | Status: ACTIVE | COMMUNITY
Start: 2022-08-15 | End: 2023-08-10

## 2023-03-31 RX ORDER — UPADACITINIB 15 MG/1
START AFTER 8 WEEK 45MG INDUCTION- 1 TABLET TABLET, EXTENDED RELEASE ORAL ONCE A DAY
Qty: 30 | Refills: 11 | OUTPATIENT

## 2023-03-31 RX ORDER — UPADACITINIB 15 MG/1
START AFTER 8 WEEK 45MG INDUCTION- 1 TABLET TABLET, EXTENDED RELEASE ORAL ONCE A DAY
Qty: 30 | Refills: 11 | Status: ACTIVE | COMMUNITY
Start: 2022-11-17 | End: 2023-11-12

## 2023-03-31 NOTE — HPI-TODAY'S VISIT:
Is a 27-year-old female who returns for follow-up of ulcerative proctitis.  She has previously failed mesalamine with Apriso as well as humira. She has been on rinvoq and doing quite well. drastic reduction in loose stools going from 4-6 bloody stools daily to 1-2 formed normal stools. she is quite happy with results. No additional complaints.  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  She just works part-time in [place] on Tuesdays and Wednesdays. Sb

## 2023-04-27 ENCOUNTER — WEB ENCOUNTER (OUTPATIENT)
Dept: URBAN - METROPOLITAN AREA CLINIC 35 | Facility: CLINIC | Age: 28
End: 2023-04-27

## 2023-04-27 LAB
A/G RATIO: 1.8
ABSOLUTE BASOPHILS: 49
ABSOLUTE EOSINOPHILS: 153
ABSOLUTE LYMPHOCYTES: 2654
ABSOLUTE MONOCYTES: 500
ABSOLUTE NEUTROPHILS: 2745
ALBUMIN: 4.6
ALKALINE PHOSPHATASE: 61
ALT (SGPT): 24
AST (SGOT): 22
BASOPHILS: 0.8
BILIRUBIN, TOTAL: 0.4
BUN/CREATININE RATIO: (no result)
BUN: 13
CALCIUM: 8.9
CARBON DIOXIDE, TOTAL: 24
CHLORIDE: 105
CHOL/HDLC RATIO: 2.4
CHOLESTEROL, TOTAL: 141
CREATININE: 0.55
EGFR: 129
EOSINOPHILS: 2.5
GLOBULIN, TOTAL: 2.5
GLUCOSE: 79
HDL CHOLESTEROL: 58
HEMATOCRIT: 35.4
HEMOGLOBIN: 12
LDL CHOLESTEROL CALC: 74
LYMPHOCYTES: 43.5
MCH: 31.7
MCHC: 33.9
MCV: 93.7
MONOCYTES: 8.2
MPV: 9.2
NEUTROPHILS: 45
NON HDL CHOLESTEROL: 83
PLATELET COUNT: 405
POTASSIUM: 4.4
PROTEIN, TOTAL: 7.1
RDW: 12.1
RED BLOOD CELL COUNT: 3.78
SODIUM: 140
TRIGLYCERIDES: 32
WHITE BLOOD CELL COUNT: 6.1

## 2023-05-17 ENCOUNTER — TELEPHONE ENCOUNTER (OUTPATIENT)
Dept: URBAN - NONMETROPOLITAN AREA CLINIC 2 | Facility: CLINIC | Age: 28
End: 2023-05-17

## 2023-05-30 ENCOUNTER — OFFICE VISIT (OUTPATIENT)
Dept: URBAN - NONMETROPOLITAN AREA CLINIC 13 | Facility: CLINIC | Age: 28
End: 2023-05-30

## 2023-05-30 ENCOUNTER — OFFICE VISIT (OUTPATIENT)
Dept: URBAN - NONMETROPOLITAN AREA CLINIC 13 | Facility: CLINIC | Age: 28
End: 2023-05-30
Payer: COMMERCIAL

## 2023-05-30 VITALS
BODY MASS INDEX: 24.96 KG/M2 | DIASTOLIC BLOOD PRESSURE: 79 MMHG | SYSTOLIC BLOOD PRESSURE: 114 MMHG | HEART RATE: 74 BPM | TEMPERATURE: 98.1 F | WEIGHT: 159 LBS | HEIGHT: 67 IN

## 2023-05-30 DIAGNOSIS — K51.211 ULCERATIVE PROCTITIS WITH RECTAL BLEEDING: ICD-10-CM

## 2023-05-30 DIAGNOSIS — D84.9 IMMUNOSUPPRESSED STATUS: ICD-10-CM

## 2023-05-30 PROCEDURE — 99214 OFFICE O/P EST MOD 30 MIN: CPT | Performed by: NURSE PRACTITIONER

## 2023-05-30 RX ORDER — FAMOTIDINE 20 MG/1
1 TABLET AT LUNCH AND 1 TABLET AT  BEDTIME TABLET, FILM COATED ORAL TWICE A DAY
Qty: 180 TABLET | Refills: 1 | Status: ACTIVE | COMMUNITY
Start: 2021-07-01

## 2023-05-30 RX ORDER — ADALIMUMAB 40MG/0.4ML
INJECT 1 PEN SUBCUTANEOUSLY EVERY 2 WEEKS KIT SUBCUTANEOUS
Status: ACTIVE | COMMUNITY

## 2023-05-30 RX ORDER — UPADACITINIB 15 MG/1
START AFTER 8 WEEK 45MG INDUCTION- 1 TABLET TABLET, EXTENDED RELEASE ORAL ONCE A DAY
Qty: 30 | Refills: 11 | OUTPATIENT

## 2023-05-30 RX ORDER — UPADACITINIB 15 MG/1
START AFTER 8 WEEK 45MG INDUCTION- 1 TABLET TABLET, EXTENDED RELEASE ORAL ONCE A DAY
Qty: 30 | Refills: 11 | Status: ACTIVE | COMMUNITY
Start: 2022-11-17 | End: 2023-11-12

## 2023-05-30 RX ORDER — UPADACITINIB 15 MG/1
START AFTER 8 WEEK 45MG INDUCTION- 1 TABLET TABLET, EXTENDED RELEASE ORAL ONCE A DAY
Qty: 30 | Refills: 11 | Status: ACTIVE | COMMUNITY

## 2023-05-30 RX ORDER — ADALIMUMAB 40MG/0.4ML
1 SUBQ Q  WEEK KIT SUBCUTANEOUS
Qty: 12 PRE-FILLED PEN SYRINGE | Refills: 1 | Status: ACTIVE | COMMUNITY

## 2023-05-30 NOTE — HPI-TODAY'S VISIT:
*** is a *** year old M/F who presents today for ***.  colonoscopy: family history, polyps, AC, previous  f/u: bleeding, pain, reflux, change in bowel habits/appetite/weight, NSAID/alcohol/caffeine/tobacco use  heart and lung issues; previous surgeries

## 2023-05-30 NOTE — HPI-OTHER HISTORIES
3/31/2023 Is a 27-year-old female who returns for follow-up of ulcerative proctitis.  She has previously failed mesalamine with Apriso as well as humira. She has been on rinvoq and doing quite well. drastic reduction in loose stools going from 4-6 bloody stools daily to 1-2 formed normal stools. she is quite happy with results. No additional complaints.  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  She just works part-time in [place] on Tuesdays and Wednesdays. [place]

## 2023-05-30 NOTE — HPI-OTHER HISTORIES
Fariba is a 27-year-old female who returns for follow-up of ulcerative proctitis. She has previously failed mesalamine with Apriso as well as humira. She has been on rinvoq and doing quite well. drastic reduction in loose stools going from 4-6 bloody stools daily to 1-2 formed normal stools. she is quite happy with results. No additional complaints. 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 Sb

## 2023-07-20 ENCOUNTER — TELEPHONE ENCOUNTER (OUTPATIENT)
Dept: URBAN - NONMETROPOLITAN AREA CLINIC 2 | Facility: CLINIC | Age: 28
End: 2023-07-20

## 2023-07-20 RX ORDER — UPADACITINIB 15 MG/1
START AFTER 8 WEEK 45MG INDUCTION- 1 TABLET TABLET, EXTENDED RELEASE ORAL ONCE A DAY
Qty: 30 | Refills: 11
End: 2024-07-14

## 2023-08-01 ENCOUNTER — WEB ENCOUNTER (OUTPATIENT)
Dept: URBAN - NONMETROPOLITAN AREA CLINIC 13 | Facility: CLINIC | Age: 28
End: 2023-08-01

## 2023-08-01 RX ORDER — UPADACITINIB 15 MG/1
START AFTER 8 WEEK 45MG INDUCTION- 1 TABLET TABLET, EXTENDED RELEASE ORAL ONCE A DAY
Qty: 30 | Refills: 11
End: 2024-07-27

## 2023-09-07 ENCOUNTER — LAB OUTSIDE AN ENCOUNTER (OUTPATIENT)
Dept: URBAN - METROPOLITAN AREA CLINIC 80 | Facility: CLINIC | Age: 28
End: 2023-09-07

## 2023-09-07 ENCOUNTER — DASHBOARD ENCOUNTERS (OUTPATIENT)
Age: 28
End: 2023-09-07

## 2023-09-07 ENCOUNTER — OFFICE VISIT (OUTPATIENT)
Dept: URBAN - METROPOLITAN AREA CLINIC 80 | Facility: CLINIC | Age: 28
End: 2023-09-07
Payer: COMMERCIAL

## 2023-09-07 VITALS
BODY MASS INDEX: 25.65 KG/M2 | HEART RATE: 84 BPM | TEMPERATURE: 97.8 F | HEIGHT: 67 IN | DIASTOLIC BLOOD PRESSURE: 72 MMHG | SYSTOLIC BLOOD PRESSURE: 124 MMHG | WEIGHT: 163.4 LBS

## 2023-09-07 DIAGNOSIS — K51.211 ULCERATIVE PROCTITIS WITH RECTAL BLEEDING: ICD-10-CM

## 2023-09-07 PROCEDURE — 99213 OFFICE O/P EST LOW 20 MIN: CPT | Performed by: PHYSICIAN ASSISTANT

## 2023-09-07 RX ORDER — UPADACITINIB 15 MG/1
START AFTER 8 WEEK 45MG INDUCTION- 1 TABLET TABLET, EXTENDED RELEASE ORAL ONCE A DAY
Qty: 30 | Refills: 11

## 2023-09-07 RX ORDER — ADALIMUMAB 40MG/0.4ML
INJECT 1 PEN SUBCUTANEOUSLY EVERY 2 WEEKS KIT SUBCUTANEOUS
Status: DISCONTINUED | COMMUNITY

## 2023-09-07 RX ORDER — POLYETHYLENE GLYCOL 3350, SODIUM SULFATE, SODIUM CHLORIDE, POTASSIUM CHLORIDE, ASCORBIC ACID, SODIUM ASCORBATE 140-9-5.2G
AS DIRECTED KIT ORAL AS DIRECTED
Qty: 1 | Refills: 0 | OUTPATIENT
Start: 2023-09-07 | End: 2023-09-08

## 2023-09-07 RX ORDER — FAMOTIDINE 20 MG/1
1 TABLET AT LUNCH AND 1 TABLET AT  BEDTIME TABLET, FILM COATED ORAL TWICE A DAY
Qty: 180 TABLET | Refills: 1 | Status: DISCONTINUED | COMMUNITY
Start: 2021-07-01

## 2023-09-07 RX ORDER — UPADACITINIB 15 MG/1
START AFTER 8 WEEK 45MG INDUCTION- 1 TABLET TABLET, EXTENDED RELEASE ORAL ONCE A DAY
Qty: 30 | Refills: 11 | Status: ACTIVE | COMMUNITY
End: 2024-07-27

## 2023-09-07 RX ORDER — UPADACITINIB 15 MG/1
START AFTER 8 WEEK 45MG INDUCTION- 1 TABLET TABLET, EXTENDED RELEASE ORAL ONCE A DAY
Qty: 30 | Refills: 11 | Status: DISCONTINUED | COMMUNITY
Start: 2022-11-17 | End: 2023-11-12

## 2023-09-07 RX ORDER — ADALIMUMAB 40MG/0.4ML
1 SUBQ Q  WEEK KIT SUBCUTANEOUS
Qty: 12 PRE-FILLED PEN SYRINGE | Refills: 1 | Status: DISCONTINUED | COMMUNITY

## 2023-09-07 NOTE — HPI-TODAY'S VISIT:
Pts last colon 7/2021 -- ulcerative proctitis Was on Humira and mesalamine - stopped working had to have lots of steroids inbetweent now on Rinvoq for the past year - on 15mg a day doing great on it had a lapse when insurance changed pharmacies so went a few weeks without it - had some bloating and frequent stools during that time - better now she is having 2 BM a day no BRBPR or melena no abd pain some joint pain  in 2019 had a partial ACL tear - when was off the Rinvoq for a few weeks her knee hurt bad - now back on it and better when lapsed of meds she also had bad rash on elbows, chest, fingers and thighs - better now has not had flu shot yet - is planning on getting this weekend, as well as covid booster

## 2023-11-27 ENCOUNTER — CLAIMS CREATED FROM THE CLAIM WINDOW (OUTPATIENT)
Dept: URBAN - METROPOLITAN AREA CLINIC 4 | Facility: CLINIC | Age: 28
End: 2023-11-27
Payer: COMMERCIAL

## 2023-11-27 ENCOUNTER — OFFICE VISIT (OUTPATIENT)
Dept: URBAN - METROPOLITAN AREA SURGERY CENTER 19 | Facility: SURGERY CENTER | Age: 28
End: 2023-11-27
Payer: COMMERCIAL

## 2023-11-27 DIAGNOSIS — K51.90 COLITIS, ULCERATIVE: ICD-10-CM

## 2023-11-27 DIAGNOSIS — K62.89 ERYTHEMA OF RECTUM: ICD-10-CM

## 2023-11-27 DIAGNOSIS — K63.89 OTHER SPECIFIED DISEASES OF INTESTINE: ICD-10-CM

## 2023-11-27 DIAGNOSIS — K51.20 ULCERATIVE (CHRONIC) PROCTITIS WITHOUT COMPLICATIONS: ICD-10-CM

## 2023-11-27 PROCEDURE — 00811 ANES LWR INTST NDSC NOS: CPT | Performed by: NURSE ANESTHETIST, CERTIFIED REGISTERED

## 2023-11-27 PROCEDURE — 45380 COLONOSCOPY AND BIOPSY: CPT | Performed by: INTERNAL MEDICINE

## 2023-11-27 PROCEDURE — 88305 TISSUE EXAM BY PATHOLOGIST: CPT | Performed by: PATHOLOGY

## 2023-11-27 PROCEDURE — G8907 PT DOC NO EVENTS ON DISCHARG: HCPCS | Performed by: INTERNAL MEDICINE

## 2023-11-27 RX ORDER — UPADACITINIB 15 MG/1
START AFTER 8 WEEK 45MG INDUCTION- 1 TABLET TABLET, EXTENDED RELEASE ORAL ONCE A DAY
Qty: 30 | Refills: 11 | Status: ACTIVE | COMMUNITY

## 2023-11-30 ENCOUNTER — LAB OUTSIDE AN ENCOUNTER (OUTPATIENT)
Dept: URBAN - NONMETROPOLITAN AREA CLINIC 13 | Facility: CLINIC | Age: 28
End: 2023-11-30

## 2023-12-04 ENCOUNTER — OFFICE VISIT (OUTPATIENT)
Dept: URBAN - NONMETROPOLITAN AREA CLINIC 13 | Facility: CLINIC | Age: 28
End: 2023-12-04

## 2024-05-23 ENCOUNTER — OFFICE VISIT (OUTPATIENT)
Dept: URBAN - METROPOLITAN AREA CLINIC 80 | Facility: CLINIC | Age: 29
End: 2024-05-23

## 2024-06-13 ENCOUNTER — OFFICE VISIT (OUTPATIENT)
Dept: URBAN - METROPOLITAN AREA CLINIC 80 | Facility: CLINIC | Age: 29
End: 2024-06-13

## 2024-07-09 ENCOUNTER — OFFICE VISIT (OUTPATIENT)
Dept: URBAN - METROPOLITAN AREA CLINIC 80 | Facility: CLINIC | Age: 29
End: 2024-07-09

## 2024-08-29 ENCOUNTER — OFFICE VISIT (OUTPATIENT)
Dept: URBAN - METROPOLITAN AREA CLINIC 80 | Facility: CLINIC | Age: 29
End: 2024-08-29
Payer: COMMERCIAL

## 2024-08-29 VITALS
HEIGHT: 67 IN | SYSTOLIC BLOOD PRESSURE: 100 MMHG | BODY MASS INDEX: 26.93 KG/M2 | WEIGHT: 171.6 LBS | TEMPERATURE: 97.2 F | DIASTOLIC BLOOD PRESSURE: 62 MMHG | HEART RATE: 68 BPM

## 2024-08-29 DIAGNOSIS — D84.89 OTHER IMMUNODEFICIENCIES: ICD-10-CM

## 2024-08-29 DIAGNOSIS — Z12.11 COLON CANCER SCREENING: ICD-10-CM

## 2024-08-29 DIAGNOSIS — K51.211 ULCERATIVE PROCTITIS WITH RECTAL BLEEDING: ICD-10-CM

## 2024-08-29 PROCEDURE — 99214 OFFICE O/P EST MOD 30 MIN: CPT | Performed by: INTERNAL MEDICINE

## 2024-08-29 RX ORDER — ETRASIMOD 2 MG/1
1 TABLET TABLET, FILM COATED ORAL ONCE A DAY
Qty: 90 TABLET | Refills: 3 | OUTPATIENT
Start: 2024-08-29 | End: 2025-08-24

## 2024-08-29 RX ORDER — HYDROCORTISONE ACETATE 25 MG/1
1 SUPPOSITORY SUPPOSITORY RECTAL TWICE DAILY
Qty: 14 SUPPOSITORY | Refills: 2 | OUTPATIENT
Start: 2024-08-29 | End: 2024-10-10

## 2024-08-29 RX ORDER — UPADACITINIB 15 MG/1
START AFTER 8 WEEK 45MG INDUCTION- 1 TABLET TABLET, EXTENDED RELEASE ORAL ONCE A DAY
Qty: 30 | Refills: 11 | Status: ON HOLD | COMMUNITY

## 2024-08-29 NOTE — HPI-TODAY'S VISIT:
Colonoscopy 11/23 with proctitis, all other Bx normal.  was doing Rinvoq and doing great but could not get it covered  About a month ago started bleeding again: lots of mucous and blood.

## 2024-08-30 LAB
A/G RATIO: 1.4
ALBUMIN: 4.5
ALKALINE PHOSPHATASE: 59
ALT (SGPT): 13
AST (SGOT): 16
BILIRUBIN, TOTAL: 0.4
BUN/CREATININE RATIO: (no result)
BUN: 18
C-REACTIVE PROTEIN, QUANT: <3
CALCIUM: 9.7
CARBON DIOXIDE, TOTAL: 23
CHLORIDE: 107
CREATININE: 0.69
EGFR: 120
GLOBULIN, TOTAL: 3.2
GLUCOSE: 89
HEMATOCRIT: 42.8
HEMOGLOBIN: 14.2
MCH: 31.3
MCHC: 33.2
MCV: 94.3
MPV: 8.6
PLATELET COUNT: 396
POTASSIUM: 4.4
PROTEIN, TOTAL: 7.7
RDW: 12.3
RED BLOOD CELL COUNT: 4.54
SED RATE BY MODIFIED: 6
SODIUM: 135
WHITE BLOOD CELL COUNT: 7.3

## 2024-09-03 ENCOUNTER — TELEPHONE ENCOUNTER (OUTPATIENT)
Dept: URBAN - METROPOLITAN AREA CLINIC 80 | Facility: CLINIC | Age: 29
End: 2024-09-03

## 2024-09-10 ENCOUNTER — TELEPHONE ENCOUNTER (OUTPATIENT)
Dept: URBAN - METROPOLITAN AREA CLINIC 80 | Facility: CLINIC | Age: 29
End: 2024-09-10

## 2024-09-11 ENCOUNTER — TELEPHONE ENCOUNTER (OUTPATIENT)
Dept: URBAN - METROPOLITAN AREA CLINIC 80 | Facility: CLINIC | Age: 29
End: 2024-09-11

## 2024-09-11 RX ORDER — OZANIMOD HYDROCHLORIDE 0.92 MG/1
1 CAPSULE CAPSULE ORAL ONCE A DAY
Qty: 30 | OUTPATIENT
Start: 2024-09-11 | End: 2024-10-11

## 2024-09-13 ENCOUNTER — TELEPHONE ENCOUNTER (OUTPATIENT)
Dept: URBAN - METROPOLITAN AREA CLINIC 80 | Facility: CLINIC | Age: 29
End: 2024-09-13

## 2024-09-17 ENCOUNTER — TELEPHONE ENCOUNTER (OUTPATIENT)
Dept: URBAN - METROPOLITAN AREA CLINIC 80 | Facility: CLINIC | Age: 29
End: 2024-09-17

## 2024-10-16 ENCOUNTER — TELEPHONE ENCOUNTER (OUTPATIENT)
Dept: URBAN - METROPOLITAN AREA CLINIC 80 | Facility: CLINIC | Age: 29
End: 2024-10-16

## 2024-10-22 ENCOUNTER — OFFICE VISIT (OUTPATIENT)
Dept: URBAN - METROPOLITAN AREA CLINIC 80 | Facility: CLINIC | Age: 29
End: 2024-10-22
Payer: COMMERCIAL

## 2024-10-22 VITALS
BODY MASS INDEX: 26.62 KG/M2 | HEART RATE: 72 BPM | DIASTOLIC BLOOD PRESSURE: 72 MMHG | WEIGHT: 169.6 LBS | SYSTOLIC BLOOD PRESSURE: 110 MMHG | HEIGHT: 67 IN | TEMPERATURE: 97.6 F

## 2024-10-22 DIAGNOSIS — K51.20 ULCERATIVE PROCTITIS WITHOUT COMPLICATION: ICD-10-CM

## 2024-10-22 DIAGNOSIS — K51.211 ULCERATIVE PROCTITIS WITH RECTAL BLEEDING: ICD-10-CM

## 2024-10-22 DIAGNOSIS — D84.9 IMMUNOSUPPRESSED STATUS: ICD-10-CM

## 2024-10-22 PROCEDURE — 99214 OFFICE O/P EST MOD 30 MIN: CPT | Performed by: INTERNAL MEDICINE

## 2024-10-22 RX ORDER — ETRASIMOD 2 MG/1
1 TABLET TABLET, FILM COATED ORAL ONCE A DAY
Qty: 90 TABLET | Refills: 3 | Status: ON HOLD | COMMUNITY
Start: 2024-08-29 | End: 2025-08-24

## 2024-10-22 RX ORDER — UPADACITINIB 15 MG/1
START AFTER 8 WEEK 45MG INDUCTION- 1 TABLET TABLET, EXTENDED RELEASE ORAL ONCE A DAY
Qty: 30 | Refills: 11 | Status: ON HOLD | COMMUNITY

## 2024-10-22 RX ORDER — HYDROCORTISONE ACETATE 25 MG/1
1 SUPPOSITORY SUPPOSITORY RECTAL ONCE A DAY
Qty: 14 SUPPOSITORY | Refills: 2 | OUTPATIENT
Start: 2024-10-22 | End: 2024-12-02

## 2024-10-22 RX ORDER — USTEKINUMAB 90 MG/ML
PRE-FILLED SYRINGE INJECTION, SOLUTION SUBCUTANEOUS
OUTPATIENT
Start: 2024-10-22

## 2024-10-22 RX ORDER — USTEKINUMAB 130 MG/26ML
AS DIRECTED SOLUTION INTRAVENOUS ONCE
OUTPATIENT
Start: 2024-10-22 | End: 2024-10-23

## 2024-10-22 NOTE — PHYSICAL EXAM CONSTITUTIONAL:
well developed, well nourished , in no acute distress , ambulating without difficulty , normal communication ability
aching

## 2024-10-25 LAB
HEPATITIS B CORE AB TOTAL: (no result)
HEPATITIS B SURFACE AB IMMUNITY, QN: 8
HEPATITIS B SURFACE ANTIGEN: (no result)
MITOGEN-NIL: >10
QUANTIFERON NIL VALUE: 0.03
QUANTIFERON TB1 AG VALUE: 0
QUANTIFERON TB2 AG VALUE: 0
QUANTIFERON-TB GOLD PLUS: NEGATIVE

## 2024-12-13 ENCOUNTER — TELEPHONE ENCOUNTER (OUTPATIENT)
Dept: URBAN - METROPOLITAN AREA CLINIC 80 | Facility: CLINIC | Age: 29
End: 2024-12-13

## 2024-12-13 RX ORDER — PREDNISONE 20 MG/1
1 TABLET TABLET ORAL ONCE A DAY
Qty: 14 TABLET | Refills: 1 | OUTPATIENT
Start: 2024-12-21 | End: 2025-01-18

## 2025-01-22 ENCOUNTER — TELEPHONE ENCOUNTER (OUTPATIENT)
Dept: URBAN - METROPOLITAN AREA CLINIC 80 | Facility: CLINIC | Age: 30
End: 2025-01-22

## 2025-02-10 ENCOUNTER — OFFICE VISIT (OUTPATIENT)
Dept: URBAN - METROPOLITAN AREA CLINIC 79 | Facility: CLINIC | Age: 30
End: 2025-02-10
Payer: COMMERCIAL

## 2025-02-10 VITALS
HEIGHT: 67 IN | BODY MASS INDEX: 25.58 KG/M2 | SYSTOLIC BLOOD PRESSURE: 122 MMHG | DIASTOLIC BLOOD PRESSURE: 75 MMHG | TEMPERATURE: 97.3 F | WEIGHT: 163 LBS

## 2025-02-10 DIAGNOSIS — K51.20 ULCERATIVE PROCTITIS WITHOUT COMPLICATION: ICD-10-CM

## 2025-02-10 PROCEDURE — 96413 CHEMO IV INFUSION 1 HR: CPT | Performed by: INTERNAL MEDICINE

## 2025-02-10 RX ORDER — UPADACITINIB 15 MG/1
START AFTER 8 WEEK 45MG INDUCTION- 1 TABLET TABLET, EXTENDED RELEASE ORAL ONCE A DAY
Qty: 30 | Refills: 11 | Status: ON HOLD | COMMUNITY

## 2025-02-10 RX ORDER — USTEKINUMAB 90 MG/ML
PRE-FILLED SYRINGE INJECTION, SOLUTION SUBCUTANEOUS
Status: ACTIVE | COMMUNITY
Start: 2024-10-22

## 2025-02-10 RX ORDER — ETRASIMOD 2 MG/1
1 TABLET TABLET, FILM COATED ORAL ONCE A DAY
Qty: 90 TABLET | Refills: 3 | Status: ON HOLD | COMMUNITY
Start: 2024-08-29 | End: 2025-08-24

## 2025-02-17 ENCOUNTER — TELEPHONE ENCOUNTER (OUTPATIENT)
Dept: URBAN - METROPOLITAN AREA CLINIC 80 | Facility: CLINIC | Age: 30
End: 2025-02-17

## 2025-02-17 RX ORDER — USTEKINUMAB 90 MG/ML
PRE-FILLED SYRINGE INJECTION, SOLUTION SUBCUTANEOUS
Qty: 1 | Refills: 6
Start: 2024-10-22 | End: 2026-04-14

## 2025-02-18 ENCOUNTER — TELEPHONE ENCOUNTER (OUTPATIENT)
Dept: URBAN - METROPOLITAN AREA CLINIC 80 | Facility: CLINIC | Age: 30
End: 2025-02-18

## 2025-04-09 ENCOUNTER — TELEPHONE ENCOUNTER (OUTPATIENT)
Dept: URBAN - METROPOLITAN AREA CLINIC 80 | Facility: CLINIC | Age: 30
End: 2025-04-09

## 2025-04-09 RX ORDER — USTEKINUMAB 90 MG/ML
PRE-FILLED SYRINGE INJECTION, SOLUTION SUBCUTANEOUS
Qty: 1 | Refills: 6
Start: 2024-10-22 | End: 2026-06-05